# Patient Record
Sex: MALE | URBAN - METROPOLITAN AREA
[De-identification: names, ages, dates, MRNs, and addresses within clinical notes are randomized per-mention and may not be internally consistent; named-entity substitution may affect disease eponyms.]

---

## 2023-01-05 ENCOUNTER — OFFICE VISIT (OUTPATIENT)
Dept: URBAN - METROPOLITAN AREA CLINIC 27 | Facility: CLINIC | Age: 28
End: 2023-01-05
Payer: COMMERCIAL

## 2023-01-05 ENCOUNTER — LAB OUTSIDE AN ENCOUNTER (OUTPATIENT)
Dept: URBAN - METROPOLITAN AREA CLINIC 27 | Facility: CLINIC | Age: 28
End: 2023-01-05

## 2023-01-05 VITALS
BODY MASS INDEX: 25.2 KG/M2 | RESPIRATION RATE: 17 BRPM | WEIGHT: 180 LBS | HEART RATE: 62 BPM | DIASTOLIC BLOOD PRESSURE: 60 MMHG | SYSTOLIC BLOOD PRESSURE: 116 MMHG | HEIGHT: 71 IN

## 2023-01-05 DIAGNOSIS — R19.02 LEFT UPPER QUADRANT ABDOMINAL MASS: ICD-10-CM

## 2023-01-05 PROCEDURE — 99204 OFFICE O/P NEW MOD 45 MIN: CPT | Performed by: INTERNAL MEDICINE

## 2023-01-05 NOTE — HPI-TODAY'S VISIT:
27-year-old male here for abdominal wall mass.  He noted a nodule in the left upper quadrant several weeks ago.  Nodule is nontender.  However he does get some pain when he lays on his left side in that area.  Denies melena, weight loss, dysphagia, early satiety.  No family history of GI malignancies.

## 2023-06-22 ENCOUNTER — LAB OUTSIDE AN ENCOUNTER (OUTPATIENT)
Dept: URBAN - METROPOLITAN AREA CLINIC 27 | Facility: CLINIC | Age: 28
End: 2023-06-22

## 2023-06-22 ENCOUNTER — OFFICE VISIT (OUTPATIENT)
Dept: URBAN - METROPOLITAN AREA CLINIC 27 | Facility: CLINIC | Age: 28
End: 2023-06-22
Payer: COMMERCIAL

## 2023-06-22 ENCOUNTER — DASHBOARD ENCOUNTERS (OUTPATIENT)
Age: 28
End: 2023-06-22

## 2023-06-22 VITALS
HEIGHT: 71 IN | SYSTOLIC BLOOD PRESSURE: 129 MMHG | BODY MASS INDEX: 25.2 KG/M2 | RESPIRATION RATE: 17 BRPM | WEIGHT: 180 LBS | HEART RATE: 78 BPM | DIASTOLIC BLOOD PRESSURE: 64 MMHG

## 2023-06-22 DIAGNOSIS — R10.12 LEFT UPPER QUADRANT PAIN: ICD-10-CM

## 2023-06-22 DIAGNOSIS — R74.8 ABNORMAL LIVER ENZYMES: ICD-10-CM

## 2023-06-22 PROCEDURE — 99214 OFFICE O/P EST MOD 30 MIN: CPT | Performed by: INTERNAL MEDICINE

## 2023-06-22 NOTE — HPI-TODAY'S VISIT:
27-year-old male here for follow-up of abdominal discomfort/bulge.  He has had a bulge in the left upper quadrant as well as discomfort when he lays on his left side for almost a year now, getting worse lately.  He has also been noted to have elevated liver enzymes with ALT being 56 at last visit.  No family history of liver disease.  No significant alcohol use.  He does have a history of high cholesterol.

## 2023-06-27 LAB
A/G RATIO: 1.8
ABSOLUTE BASOPHILS: 43
ABSOLUTE EOSINOPHILS: 149
ABSOLUTE LYMPHOCYTES: 1725
ABSOLUTE MONOCYTES: 518
ABSOLUTE NEUTROPHILS: 4665
ALBUMIN: 4.9
ALKALINE PHOSPHATASE: 75
ALT (SGPT): 49
AST (SGOT): 19
BASOPHILS: 0.6
BILIRUBIN, TOTAL: 0.4
BUN/CREATININE RATIO: (no result)
BUN: 21
CALCIUM: 9.8
CARBON DIOXIDE, TOTAL: 29
CHLORIDE: 102
CREATININE: 0.93
EGFR: 115
EOSINOPHILS: 2.1
GLOBULIN, TOTAL: 2.8
GLUCOSE: 92
HEMATOCRIT: 46.6
HEMOGLOBIN: 15.9
HEPATITIS B SURFACE ANTIGEN: (no result)
HEPATITIS C ANTIBODY: (no result)
LYMPHOCYTES: 24.3
MCH: 31.5
MCHC: 34.1
MCV: 92.3
MONOCYTES: 7.3
MPV: 11.5
NEUTROPHILS: 65.7
PLATELET COUNT: 189
POTASSIUM: 4.3
PROTEIN, TOTAL: 7.7
RDW: 12.5
RED BLOOD CELL COUNT: 5.05
SMOOTH MUSCLE AB SCREEN: NEGATIVE
SODIUM: 141
WHITE BLOOD CELL COUNT: 7.1

## 2023-09-08 ENCOUNTER — TELEPHONE ENCOUNTER (OUTPATIENT)
Dept: URBAN - METROPOLITAN AREA CLINIC 27 | Facility: CLINIC | Age: 28
End: 2023-09-08